# Patient Record
Sex: FEMALE | Race: WHITE
[De-identification: names, ages, dates, MRNs, and addresses within clinical notes are randomized per-mention and may not be internally consistent; named-entity substitution may affect disease eponyms.]

---

## 2018-04-22 ENCOUNTER — HOSPITAL ENCOUNTER (OUTPATIENT)
Dept: HOSPITAL 68 - ERH | Age: 58
Setting detail: OBSERVATION
LOS: 1 days | End: 2018-04-23
Attending: EMERGENCY MEDICINE | Admitting: EMERGENCY MEDICINE
Payer: COMMERCIAL

## 2018-04-22 VITALS — BODY MASS INDEX: 21.99 KG/M2 | WEIGHT: 112 LBS | HEIGHT: 60 IN

## 2018-04-22 DIAGNOSIS — G62.9: ICD-10-CM

## 2018-04-22 DIAGNOSIS — Z79.01: ICD-10-CM
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
## 2018-04-22 DIAGNOSIS — M19.90: ICD-10-CM

## 2018-04-22 DIAGNOSIS — T42.4X1A: Primary | ICD-10-CM

## 2018-04-22 DIAGNOSIS — Z85.43: ICD-10-CM

## 2018-04-22 DIAGNOSIS — I95.9: ICD-10-CM

## 2018-04-22 DIAGNOSIS — F10.10: ICD-10-CM

## 2018-04-22 DIAGNOSIS — Z79.82: ICD-10-CM

## 2018-04-22 LAB
ABSOLUTE GRANULOCYTE CT: 3.4 /CUMM (ref 1.4–6.5)
BASOPHILS # BLD: 0 /CUMM (ref 0–0.2)
BASOPHILS NFR BLD: 0.4 % (ref 0–2)
EOSINOPHIL # BLD: 0.1 /CUMM (ref 0–0.7)
EOSINOPHIL NFR BLD: 1.5 % (ref 0–5)
ERYTHROCYTE [DISTWIDTH] IN BLOOD BY AUTOMATED COUNT: 12.8 % (ref 11.5–14.5)
GRANULOCYTES NFR BLD: 51.2 % (ref 42.2–75.2)
HCT VFR BLD CALC: 34.9 % (ref 37–47)
LYMPHOCYTES # BLD: 2.5 /CUMM (ref 1.2–3.4)
MCH RBC QN AUTO: 33.1 PG (ref 27–31)
MCHC RBC AUTO-ENTMCNC: 34.2 G/DL (ref 33–37)
MCV RBC AUTO: 97 FL (ref 81–99)
MONOCYTES # BLD: 0.6 /CUMM (ref 0.1–0.6)
PLATELET # BLD: 178 /CUMM (ref 130–400)
PMV BLD AUTO: 8.3 FL (ref 7.4–10.4)
RED BLOOD CELL CT: 3.59 /CUMM (ref 4.2–5.4)
WBC # BLD AUTO: 6.6 /CUMM (ref 4.8–10.8)

## 2018-04-22 PROCEDURE — G0480 DRUG TEST DEF 1-7 CLASSES: HCPCS

## 2018-04-22 PROCEDURE — G0378 HOSPITAL OBSERVATION PER HR: HCPCS

## 2018-04-22 NOTE — ED AMS/SEIZURE/WEAK/DIZZY
**See Addendum**
History of Present Illness
 
General
Chief Complaint: ETOH/Drug Related Complaint
Stated Complaint: BIBA +ETOH, HYPOTENSION
Source: family, old records, EMS
Exam Limitations: intoxication
Allergies
Coded Allergies:
erythromycin base (Intermediate, RASH 17)
nut - unspecified (TONGUE SWELLING, RASH 18)
 
Reconcile Medications
Aspirin (Adult Low Dose Aspirin EC) 81 MG TABLET.DR   81 MG PO DAILY HEART 
HEALTH  (Reported)
Atorvastatin Calcium 40 MG TABLET   1 TAB PO DAILY CHOLESTEROL  (Reported)
Carisoprodol (SOMA) 350 MG TABLET   1 TAB PO BIDP PRN PAIN  (Reported)
Clopidogrel Bisulfate (Plavix) 75 MG TABLET   75 MG PO DAILY BLOOD THINNER  (
Reported)
Diazepam 10 MG TABLET   1 TAB PO AD PRN ANXIETY  (Reported)
Fluoxetine HCl 20 MG CAPSULE   1 CAP PO DAILY DEPRESSION  (Reported)
Gabapentin (Neurontin) 600 MG TABLET   600 MG PO TID SLEEP  (Reported)
Metoprolol Tartrate 25 MG TABLET   1 TAB PO BID HEART/BP  (Reported)
Multivitamin (Multi-Vitamin Daily) 1 EACH TABLET   1 TAB PO DAILY SUPPLEMENT  (
Reported)
Oxycodone HCl 20 MG TABLET   1 TAB PO TID PAIN  (Reported)
Tizanidine HCl 4 MG TABLET   1 TAB PO BID PRN MUSCLE SPASMS  (Reported)
 
Triage Nurses Notes Reviewed? yes
Onset: Abrupt
Duration: hour(s):, constant, continues in ED
Timing: single episode today
Injury Environment: home
No Modifying Factors: none
HPI:
57-year-old female comes into the emergency room for altered mental status by 
ambulance.  The  reports that she was seen normal prior about an hour 
before hand.  When he came home he found her on the bed unresponsive.  She has a
history of EtOH and substance abuse.  He called the ambulance.  History is 
limited.  Patient is on benzodiazepines opioids and has a history of alcoholism.
 Patient was found to be hypotensive in the field with a blood pressure 70 
initially.  She was given 0.8 mg of Narcan with minimal improvement.
(Rush Ridley)
 
Vital Signs & Intake/Output
Vital Signs & Intake/Output
 Vital Signs
 
 
Date Time Temp Pulse Resp B/P B/P Pulse O2 O2 Flow FiO2
 
     Mean Ox Delivery Rate 
 
 0237 97.2 67 20 124/74  97 Room Air  
 
4 97.8 64 18 130/68  98   
 
 1914 98.0 69 20 121/64  96 Room Air  
 
 1838 97.5 67 18 117/60  99   
 
 1716  61 16 120/67  98 Room Air  
 
 1628 96.8 65 14 110/55  96 Room Air  
 
 
 ED Intake and Output
 
 
  0000  1200
 
Intake Total 1000 
 
Output Total 2100 
 
Balance -1100 
 
   
 
Intake, IV 1000 
 
Output, Urine 2100 
 
Patient 112 lb 
 
Weight  
 
Weight Reported by Patient 
 
Measurement  
 
Method  
 
 
 
(Jaison COVARRUBIAS,Anjel FRIEND)
 
Past History
 
Travel History
Traveled to [place] past 21 day No
 
Medical History
Any Pertinent Medical History? see below for history
Neurological: peripheral neuropathy
EENT: NONE
Cardiovascular: NONE
Respiratory: NONE
Gastrointestinal: NONE
Hepatic: NONE
Renal: NONE
Musculoskeletal: fibromyalgia, osteoarthritis
Psychiatric: NONE
Endocrine: NONE
Blood Disorders: NONE
Cancer(s): ovarian cancer
GYN/Reproductive: NONE
Other Medical Hx:
RA
History of MRSA: No
History of VRE: No
History of CDIFF: No
Tetanus Vaccine: 02/22/15
 
Surgical History
Surgical History: TAHBSO KNEE SURGERY
 
Psychosocial History
Who do you live with Spouse
What is your primary language English
 
Family History
Hx Contributory? No
(Rush Ridley)
 
Review of Systems
 
Review of Systems
Constitutional:
Reports: no symptoms. 
EENTM:
Reports: no symptoms. 
Respiratory:
Reports: no symptoms. 
Cardiovascular:
Reports: no symptoms. 
GI:
Reports: no symptoms. 
Genitourinary:
Reports: no symptoms. 
Musculoskeletal:
Reports: no symptoms. 
Skin:
Reports: no symptoms. 
Neurological/Psychological:
Reports: see HPI. 
Hematologic/Endocrine:
Reports: no symptoms. 
Immunologic/Allergic:
Reports: no symptoms. 
All Other Systems: Reviewed and Negative
(Rush Ridley)
 
Physical Exam
 
Physical Exam
General Appearance: sedated, mild distress, intoxicated
Head: atraumatic
Eyes:
Bilateral: PERRL, other (pupils dilated). 
Ears, Nose, Throat: normal ENT inspection
Neck: normal inspection
Respiratory: no respiratory distress
Cardiovascular: regular rate/rhythm
Gastrointestinal: soft
Back: normal inspection
Extremities: limited range of motion
Neurologic/Psych: responds to sternal rub
Skin: intact
 
Core Measures
ACS in differential dx? No
CVA/TIA Diagnosis No
Sepsis Present: No
Sepsis Focused Exam Completed? No
(Rush Ridley)
 
Progress
Differential Diagnosis: alcohol intoxication, CVA/stroke, drug intoxication, 
encephalitis, electrolyte imbalance, GI bleed, intracranial Hem., intracranial 
mass/tumor, migraine HA, postural hypotension, presyncope, post-traumatic 
vertigo, seizure disorder, subarachnoid Hem., vertebrobasilar insuff
Diagnostic Imaging:
Viewed by Me: Radiology Read, CT Scan.  Discussed w/RAD: Radiology Read, CT 
Scan. 
Radiology Impression: PATIENT: JOSELINE GALLEGOS  MEDICAL RECORD NO: [id number] 
PRESENT AGE: 57  PATIENT ACCOUNT NO: [id number] : 60  LOCATION: ER 
ORDERING PHYSICIAN: Rush DRIVER     SERVICE DATE:  EXAM TYPE
: CAT - CT HEAD WO IV CONTRAST EXAMINATION: CT HEAD WITHOUT CONTRAST CLINICAL 
INFORMATION: Reported mental status. COMPARISON: MR brain 2017. CT head . TECHNIQUE: Contiguous axial imaging was performed from the skull base 
to vertex without intravenous administration of contrast. DLP: 970.96 mGy-cm 
FINDINGS: Stable appearance of the brain with minimal periventricular and 
subcortical white matter hypodensity bilaterally, more pronounced on the left, 
likely reflecting sequela of chronic white matter disease. There is no evidence 
of acute intracranial hemorrhage or territorial infarction. No abnormal mass 
effect or midline shift is seen. Gray to white matter differentiation is well 
preserved. No extra-axial fluid collections are identified. The ventricles are 
normal in size. There is no abnormal attenuation within the brain parenchyma. 
The osseous structures and soft tissues are normal. The mastoid air cells and 
visualized portions of the paranasal sinuses are well aerated. IMPRESSION: 
Stable appearance of the brain. No acute intracranial pathology. DICTATED BY: 
Galo Carrillo MD  DATE/TIME DICTATED:18 :RAD.[facility] 
DATE/TIME TRANSCRIBED:18 CONFIDENTIAL, DO NOT COPY WITHOUT 
APPROPRIATE AUTHORIZATION.  <Electronically signed in Other Vendor System>      
                                                                                
SIGNED BY: Galo Carrillo MD 18, PATIENT: JOSELINE GALLEGOS  MEDICAL 
RECORD NO: [id number] PRESENT AGE: 57  PATIENT ACCOUNT NO: [id number] : 60  
LOCATION: [facility] ORDERING PHYSICIAN: Rush DRIVER     SERVICE DATE: 1612 EXAM TYPE: RAD - XRY-PORTABLE CHEST XRAY EXAMINATION: XR PORTABLE CHEST 
CLINICAL INFORMATION: Altered mental status. COMPARISON: Chest radiograph 2017. TECHNIQUE: Portable frontal view of the chest was obtained. FINDINGS: The 
patient is slightly rotated rendering evaluation suboptimal. Mild fullness of 
the right bg is likely due to patient positioning. Minimal prominence of the 
lung markings bilaterally. Cardiomediastinal silhouette and pulmonary 
vasculature are within normal limits. No pleural effusion. No acute osseous 
finding. IMPRESSION: Prominence of the right hilum likely reflects artifact due 
to patient positioning. Mild prominence of lung markings bilaterally which could
represent minimal volume overload or chronic lung disease. No focal airspace 
consolidation DICTATED BY: Galo Carrillo MD  DATE/TIME DICTATED:18
:RAD.[facility]  DATE/TIME TRANSCRIBED:18 CONFIDENTIAL, 
DO NOT COPY WITHOUT APPROPRIATE AUTHORIZATION.  <Electronically signed in Other 
Vendor System>                                                                  
                     SIGNED BY: Galo Carrillo MD 18
Initial ED EKG: normal sinus rhythm, rate (61)
Comments:
2018 7:50:49 PM
 
Patient was placed in ED observation.  The attending ER physician resumed care 
at this time.
(Edis DRIVER,Rush)
Plan of Care:
 Orders
 
 
Procedure Date/time Status
 
Regular Diet  B Active
 
Place in observation 1757 Active
 
ED Holding Orders  175 Active
 
Patient Data  175 Active
 
Vital Signs  175 Active
 
Code Status  175 Active
 
ED CRISIS PSYCH CONSULT  175 Active
 
Intake & Output  1631 Active
 
Telemetry/Cardiac Monitor  161 Active
 
Bowman, Insertion/Removal/Asses  161 Active
 
CULTURE,URINE  161 Active
 
URINE DRUGS OF ABUSE  161 Complete
 
URINALYSIS  161 Complete
 
TROPONIN LEVEL  161 Complete
 
ETHANOL  161 Complete
 
COMPREHENSIVE METABOLIC PANEL  161 Complete
 
CBC WITHOUT DIFFERENTIAL 1612 Complete
 
EKG 1612 Active
 
 
 Laboratory Tests
 
 
 
18 1655:
Serum Alcohol 275.0
 
18 1655:
Anion Gap 15, Estimated GFR > 60, BUN/Creatinine Ratio 20.0, Glucose 90, Calcium
9.3, Total Bilirubin 0.4, AST 22, ALT 21, Alkaline Phosphatase 78, Troponin I < 
0.01, Total Protein 6.3, Albumin 3.9, Globulin 2.4, Albumin/Globulin Ratio 1.6, 
CBC w Diff NO MAN DIFF REQ, RBC 3.59  L, MCV 97.0, MCH 33.1  H, MCHC 34.2, RDW 
12.8, MPV 8.3, Gran % 51.2, Lymphocytes % 38.0, Monocytes % 8.9, Eosinophils % 
1.5, Basophils % 0.4, Absolute Granulocytes 3.4, Absolute Lymphocytes 2.5, 
Absolute Monocytes 0.6, Absolute Eosinophils 0.1, Absolute Basophils 0, Urine 
Opiates Screen 298, Methadone Screen < 40, Barbiturate Screen < 60, Ur 
Phencyclidine Scrn < 6.00, Amphetamines Screen < 100, U Benzodiazepines Scrn > 
800  H, Urine Cocaine Screen < 50, Urine Cannabis Screen < 5.00, Urine Color YEL
, Urine Clarity CLEAR, Urine pH 7.0, Ur Specific Gravity 1.015, Urine Protein 
NEG, Urine Ketones NEG, Urine Nitrite NEG, Urine Bilirubin NEG, Urine 
Urobilinogen 0.2, Ur Leukocyte Esterase NEG, Ur Microscopic SEDIMENT EXAMINED, 
Urine RBC RARE, Urine WBC RARE, Ur Epithelial Cells RARE, Urine Bacteria RARE  H
, Urine Mucus RARE, Urine Hemoglobin TRACE-LYSED, Urine Glucose NEG
 Microbiology
 1655  URINE ROUT: Urine Culture - RECD
 
 
(Jaison COVARRUBIAS,Anjel FRIEND)
Hand-Off
   Endorsed To:
Alexy Garces MD
   Endorsed Time: 0700
   Pending: consult
(Jt COVARRUBIAS,Aquilino REZA)
 
Departure
 
Departure
Disposition: STILL A PATIENT
Condition: Stable
Clinical Impression
Primary Impression: ETOH abuse
Secondary Impressions: Benzodiazepine overdose
Referrals:
Bobby Cazares MD (PCP/Family)
 
Departure Forms:
Customer Survey
General Discharge Information
(Rush Ridley)
 
PA/NP Co-Sign Statement
Statement:
ED Attending supervision documentation-
 
[X] I saw and evaluated the patient. I have also reviewed all the pertinent lab 
results and diagnostic results. I agree with the findings and the plan of care 
as documented in the PA's/NP's documentation. 
 
[X] I have reviewed the ED Record and agree with the PA's/NP's documentation.
 
[] Additions or exceptions (if any) to the PAs/NP's note and plan are 
summarized below:
[]
 
(Jaison COVARRUBIAS,Anjel FRIEND)
 
PA/NP Co-Sign Statement
Statement:
ED Attending supervision documentation-
 
[] I saw and evaluated the patient. I have also reviewed all the pertinent lab 
results and diagnostic results. I agree with the findings and the plan of care 
as documented in the PA's/NP's documentation. 
 
[x] I have reviewed the ED Record and agree with the PA's/NP's documentation.
 
[] Additions or exceptions (if any) to the PAs/NP's note and plan are 
summarized below:
[]
 
(Jt COVARRUBIAS,Aquilino REZA)
 
Critical Care Note
 
Critical Care Note
Critical Care Time: 30-74 min (35)
(Edis DRIVER,Rush)
 
ED Attending Observation
Initial Observation Note:
I have seen and personally examined JOSELINE GALLEGOS 
on 18 at 1755. 
 
I agree with the current emergency department documentation.  The disposition 
(admission or discharge) is uncertain at this time, she needs a period of 
observation for the following reason(s): [Patient brought in intoxicated.  
Patient is 70 responsive.  Patient will need close monitoring of waiting 
sobriety.]
 
The ED Nurse caring for this patient has been personally informed as to what the
patient is being observed for.
 
(Jaison COVARRUBIAS,Anjel FRIEND)

## 2018-04-22 NOTE — RADIOLOGY REPORT
EXAMINATION:
XR PORTABLE CHEST
 
CLINICAL INFORMATION:
Altered mental status.
 
COMPARISON:
Chest radiograph 7/25/2017.
 
TECHNIQUE:
Portable frontal view of the chest was obtained.
 
FINDINGS:
The patient is slightly rotated rendering evaluation suboptimal. Mild
fullness of the right bg is likely due to patient positioning.
 
Minimal prominence of the lung markings bilaterally.
 
Cardiomediastinal silhouette and pulmonary vasculature are within normal
limits.
 
No pleural effusion.
 
No acute osseous finding.
 
IMPRESSION:
Prominence of the right hilum likely reflects artifact due to patient
positioning.
 
Mild prominence of lung markings bilaterally which could represent minimal
volume overload or chronic lung disease.
 
No focal airspace consolidation

## 2018-04-22 NOTE — CT SCAN REPORT
EXAMINATION:
CT HEAD WITHOUT CONTRAST
 
CLINICAL INFORMATION:
Reported mental status.
 
COMPARISON:
MR brain 7/26/2017. CT head 7/25/2017.
 
TECHNIQUE:
Contiguous axial imaging was performed from the skull base to vertex without
intravenous administration of contrast.
 
DLP:
970.96 mGy-cm
 
FINDINGS:
Stable appearance of the brain with minimal periventricular and subcortical
white matter hypodensity bilaterally, more pronounced on the left, likely
reflecting sequela of chronic white matter disease.
 
There is no evidence of acute intracranial hemorrhage or territorial
infarction. No abnormal mass effect or midline shift is seen. Gray to white
matter differentiation is well preserved. No extra-axial fluid collections
are identified.
 
The ventricles are normal in size. There is no abnormal attenuation within
the brain parenchyma. The osseous structures and soft tissues are normal. The
mastoid air cells and visualized portions of the paranasal sinuses are well
aerated.
 
IMPRESSION:
Stable appearance of the brain. No acute intracranial pathology.

## 2018-04-23 VITALS — SYSTOLIC BLOOD PRESSURE: 132 MMHG | DIASTOLIC BLOOD PRESSURE: 68 MMHG
